# Patient Record
Sex: MALE | Race: WHITE | Employment: OTHER | URBAN - METROPOLITAN AREA
[De-identification: names, ages, dates, MRNs, and addresses within clinical notes are randomized per-mention and may not be internally consistent; named-entity substitution may affect disease eponyms.]

---

## 2018-07-28 ENCOUNTER — APPOINTMENT (OUTPATIENT)
Dept: GENERAL RADIOLOGY | Age: 23
End: 2018-07-28
Attending: EMERGENCY MEDICINE
Payer: SELF-PAY

## 2018-07-28 ENCOUNTER — HOSPITAL ENCOUNTER (EMERGENCY)
Age: 23
Discharge: HOME | End: 2018-07-28
Attending: EMERGENCY MEDICINE
Payer: SELF-PAY

## 2018-07-28 VITALS
RESPIRATION RATE: 18 BRPM | OXYGEN SATURATION: 99 % | TEMPERATURE: 97.9 F | SYSTOLIC BLOOD PRESSURE: 125 MMHG | HEIGHT: 70 IN | WEIGHT: 195 LBS | BODY MASS INDEX: 27.92 KG/M2 | DIASTOLIC BLOOD PRESSURE: 84 MMHG | HEART RATE: 64 BPM

## 2018-07-28 DIAGNOSIS — R06.02 SOB (SHORTNESS OF BREATH): Primary | ICD-10-CM

## 2018-07-28 DIAGNOSIS — H66.90 ACUTE OTITIS MEDIA, UNSPECIFIED OTITIS MEDIA TYPE: ICD-10-CM

## 2018-07-28 LAB
ANION GAP SERPL CALC-SCNC: 6 MMOL/L (ref 3–18)
ATRIAL RATE: 59 BPM
BASOPHILS # BLD: 0.1 K/UL (ref 0–0.1)
BASOPHILS NFR BLD: 1 % (ref 0–2)
BNP SERPL-MCNC: 20 PG/ML (ref 0–450)
BUN SERPL-MCNC: 22 MG/DL (ref 7–18)
BUN/CREAT SERPL: 18 (ref 12–20)
CALCIUM SERPL-MCNC: 9.2 MG/DL (ref 8.5–10.1)
CALCULATED P AXIS, ECG09: 54 DEGREES
CALCULATED R AXIS, ECG10: 50 DEGREES
CALCULATED T AXIS, ECG11: 29 DEGREES
CHLORIDE SERPL-SCNC: 105 MMOL/L (ref 100–108)
CO2 SERPL-SCNC: 31 MMOL/L (ref 21–32)
CREAT SERPL-MCNC: 1.23 MG/DL (ref 0.6–1.3)
D DIMER PPP FEU-MCNC: <0.27 UG/ML(FEU)
DIAGNOSIS, 93000: NORMAL
DIFFERENTIAL METHOD BLD: ABNORMAL
EOSINOPHIL # BLD: 0.7 K/UL (ref 0–0.4)
EOSINOPHIL NFR BLD: 7 % (ref 0–5)
ERYTHROCYTE [DISTWIDTH] IN BLOOD BY AUTOMATED COUNT: 12.4 % (ref 11.6–14.5)
GLUCOSE SERPL-MCNC: 83 MG/DL (ref 74–99)
HCT VFR BLD AUTO: 44.2 % (ref 36–48)
HGB BLD-MCNC: 15.8 G/DL (ref 13–16)
LYMPHOCYTES # BLD: 3.4 K/UL (ref 0.9–3.6)
LYMPHOCYTES NFR BLD: 35 % (ref 21–52)
MCH RBC QN AUTO: 32.4 PG (ref 24–34)
MCHC RBC AUTO-ENTMCNC: 35.7 G/DL (ref 31–37)
MCV RBC AUTO: 90.6 FL (ref 74–97)
MONOCYTES # BLD: 1 K/UL (ref 0.05–1.2)
MONOCYTES NFR BLD: 11 % (ref 3–10)
NEUTS SEG # BLD: 4.6 K/UL (ref 1.8–8)
NEUTS SEG NFR BLD: 46 % (ref 40–73)
P-R INTERVAL, ECG05: 184 MS
PLATELET # BLD AUTO: 270 K/UL (ref 135–420)
PMV BLD AUTO: 10 FL (ref 9.2–11.8)
POTASSIUM SERPL-SCNC: 3.7 MMOL/L (ref 3.5–5.5)
Q-T INTERVAL, ECG07: 386 MS
QRS DURATION, ECG06: 90 MS
QTC CALCULATION (BEZET), ECG08: 382 MS
RBC # BLD AUTO: 4.88 M/UL (ref 4.7–5.5)
SODIUM SERPL-SCNC: 142 MMOL/L (ref 136–145)
TROPONIN I SERPL-MCNC: <0.02 NG/ML (ref 0–0.04)
VENTRICULAR RATE, ECG03: 59 BPM
WBC # BLD AUTO: 9.9 K/UL (ref 4.6–13.2)

## 2018-07-28 PROCEDURE — 71046 X-RAY EXAM CHEST 2 VIEWS: CPT

## 2018-07-28 PROCEDURE — 96361 HYDRATE IV INFUSION ADD-ON: CPT

## 2018-07-28 PROCEDURE — 74011636637 HC RX REV CODE- 636/637: Performed by: EMERGENCY MEDICINE

## 2018-07-28 PROCEDURE — 93005 ELECTROCARDIOGRAM TRACING: CPT

## 2018-07-28 PROCEDURE — 85379 FIBRIN DEGRADATION QUANT: CPT | Performed by: EMERGENCY MEDICINE

## 2018-07-28 PROCEDURE — 85025 COMPLETE CBC W/AUTO DIFF WBC: CPT | Performed by: EMERGENCY MEDICINE

## 2018-07-28 PROCEDURE — 83880 ASSAY OF NATRIURETIC PEPTIDE: CPT | Performed by: EMERGENCY MEDICINE

## 2018-07-28 PROCEDURE — 84484 ASSAY OF TROPONIN QUANT: CPT | Performed by: EMERGENCY MEDICINE

## 2018-07-28 PROCEDURE — 99283 EMERGENCY DEPT VISIT LOW MDM: CPT

## 2018-07-28 PROCEDURE — 96374 THER/PROPH/DIAG INJ IV PUSH: CPT

## 2018-07-28 PROCEDURE — 80048 BASIC METABOLIC PNL TOTAL CA: CPT | Performed by: EMERGENCY MEDICINE

## 2018-07-28 PROCEDURE — 74011250636 HC RX REV CODE- 250/636: Performed by: EMERGENCY MEDICINE

## 2018-07-28 RX ORDER — AMOXICILLIN 500 MG/1
1000 TABLET, FILM COATED ORAL 3 TIMES DAILY
Qty: 30 TAB | Refills: 0 | Status: SHIPPED | OUTPATIENT
Start: 2018-07-28 | End: 2018-08-02

## 2018-07-28 RX ORDER — PREDNISONE 50 MG/1
50 TABLET ORAL DAILY
Qty: 4 TAB | Refills: 0 | Status: SHIPPED | OUTPATIENT
Start: 2018-07-28

## 2018-07-28 RX ORDER — KETOROLAC TROMETHAMINE 30 MG/ML
15 INJECTION, SOLUTION INTRAMUSCULAR; INTRAVENOUS
Status: COMPLETED | OUTPATIENT
Start: 2018-07-28 | End: 2018-07-28

## 2018-07-28 RX ORDER — SODIUM CHLORIDE 9 MG/ML
1000 INJECTION, SOLUTION INTRAVENOUS ONCE
Status: COMPLETED | OUTPATIENT
Start: 2018-07-28 | End: 2018-07-28

## 2018-07-28 RX ADMIN — SODIUM CHLORIDE 1000 ML: 900 INJECTION, SOLUTION INTRAVENOUS at 02:14

## 2018-07-28 RX ADMIN — KETOROLAC TROMETHAMINE 15 MG: 30 INJECTION, SOLUTION INTRAMUSCULAR at 02:14

## 2018-07-28 RX ADMIN — PREDNISONE 50 MG: 20 TABLET ORAL at 03:02

## 2018-07-28 NOTE — ED PROVIDER NOTES
EMERGENCY DEPARTMENT HISTORY AND PHYSICAL EXAM 
 
1:40 AM 
 
 
Date: 7/28/2018 Patient Name: Enzo Garcia History of Presenting Illness Chief Complaint Patient presents with  Cough  Shortness of Breath History Provided By: Patient Chief Complaint: SOB Duration: 2 Weeks Timing:  Constant Location: Generalized Quality: N/A Severity: Moderate Modifying Factors: Nothing makes it better or worse Associated Symptoms: Light-headedness, cough, CP, ear pain, HA, nausea Additional History (Context): Enzo Garcia is a 25 y.o. male with a PMHx of asthma who presents with constant, moderate SOB with associated light-headedness, cough, HA, nausea, CP, and left ear pain onset x 2 weeks ago. Pt was diagnosed with pneumonia. He was prescribed a Z-pack and medication for his cough. Pt reports he was seen at [facility] last week with normal workup. Pt states he used his girlfriend's inhaler about an hour and a half ago which had no relief. Pt is unaware of having a fever. Denies recent travel, edema, vomiting, and taking OTC medication. No further concerns or complaints at this time. PCP: None Past History Past Medical History: 
Past Medical History:  
Diagnosis Date  Asthma Past Surgical History: 
Past Surgical History:  
Procedure Laterality Date  HX TYMPANOSTOMY Family History: 
History reviewed. No pertinent family history. Social History: 
Social History Substance Use Topics  Smoking status: Never Smoker  Smokeless tobacco: None  Alcohol use Yes Comment: occasionally Allergies: 
No Known Allergies Review of Systems Review of Systems Constitutional: Negative for fever. HENT: Positive for ear pain (left). Respiratory: Positive for cough and shortness of breath. Cardiovascular: Positive for chest pain. Negative for leg swelling. Gastrointestinal: Positive for nausea. Negative for vomiting.   
Neurological: Positive for light-headedness and headaches. All other systems reviewed and are negative. Physical Exam  
 
Visit Vitals  /84 (BP 1 Location: Right arm, BP Patient Position: Sitting)  Pulse 64  Temp 97.9 °F (36.6 °C)  Resp 18  Ht 5' 10\" (1.778 m)  Wt 88.5 kg (195 lb)  SpO2 99%  BMI 27.98 kg/m2 Physical Exam  
Constitutional: He is oriented to person, place, and time. He appears well-developed and well-nourished. HENT:  
Head: Normocephalic and atraumatic. L TM bulging with mild erythema R TM nl  
Neck: Neck supple. No JVD present. Cardiovascular: Normal rate and regular rhythm. Pulmonary/Chest: Effort normal and breath sounds normal. No respiratory distress. He has no wheezes. Abdominal: Soft. He exhibits no distension. There is no tenderness. There is no rebound and no guarding. Musculoskeletal: He exhibits no edema. Neurological: He is alert and oriented to person, place, and time. Skin: Skin is warm and dry. No erythema. Psychiatric: Judgment normal.  
 
 
 
Diagnostic Study Results Labs - Recent Results (from the past 12 hour(s)) CBC WITH AUTOMATED DIFF Collection Time: 07/28/18  2:03 AM  
Result Value Ref Range WBC 9.9 4.6 - 13.2 K/uL  
 RBC 4.88 4.70 - 5.50 M/uL  
 HGB 15.8 13.0 - 16.0 g/dL HCT 44.2 36.0 - 48.0 % MCV 90.6 74.0 - 97.0 FL  
 MCH 32.4 24.0 - 34.0 PG  
 MCHC 35.7 31.0 - 37.0 g/dL  
 RDW 12.4 11.6 - 14.5 % PLATELET 755 384 - 572 K/uL MPV 10.0 9.2 - 11.8 FL  
 NEUTROPHILS 46 40 - 73 % LYMPHOCYTES 35 21 - 52 % MONOCYTES 11 (H) 3 - 10 % EOSINOPHILS 7 (H) 0 - 5 % BASOPHILS 1 0 - 2 %  
 ABS. NEUTROPHILS 4.6 1.8 - 8.0 K/UL  
 ABS. LYMPHOCYTES 3.4 0.9 - 3.6 K/UL  
 ABS. MONOCYTES 1.0 0.05 - 1.2 K/UL  
 ABS. EOSINOPHILS 0.7 (H) 0.0 - 0.4 K/UL  
 ABS. BASOPHILS 0.1 0.0 - 0.1 K/UL  
 DF AUTOMATED METABOLIC PANEL, BASIC Collection Time: 07/28/18  2:03 AM  
Result Value Ref Range  Sodium 142 136 - 145 mmol/L Potassium 3.7 3.5 - 5.5 mmol/L Chloride 105 100 - 108 mmol/L  
 CO2 31 21 - 32 mmol/L Anion gap 6 3.0 - 18 mmol/L Glucose 83 74 - 99 mg/dL BUN 22 (H) 7.0 - 18 MG/DL Creatinine 1.23 0.6 - 1.3 MG/DL  
 BUN/Creatinine ratio 18 12 - 20 GFR est AA >60 >60 ml/min/1.73m2 GFR est non-AA >60 >60 ml/min/1.73m2 Calcium 9.2 8.5 - 10.1 MG/DL  
D DIMER Collection Time: 07/28/18  2:03 AM  
Result Value Ref Range D DIMER <0.27 <0.46 ug/ml(FEU) NT-PRO BNP Collection Time: 07/28/18  2:03 AM  
Result Value Ref Range NT pro-BNP 20 0 - 450 PG/ML  
TROPONIN I Collection Time: 07/28/18  2:03 AM  
Result Value Ref Range Troponin-I, Qt. <0.02 0.0 - 0.045 NG/ML  
EKG, 12 LEAD, INITIAL Collection Time: 07/28/18  2:09 AM  
Result Value Ref Range Ventricular Rate 59 BPM  
 Atrial Rate 59 BPM  
 P-R Interval 184 ms QRS Duration 90 ms Q-T Interval 386 ms QTC Calculation (Bezet) 382 ms Calculated P Axis 54 degrees Calculated R Axis 50 degrees Calculated T Axis 29 degrees Diagnosis Sinus bradycardia Otherwise normal ECG No previous ECGs available Radiologic Studies -  
XR CHEST PA LAT    (Results Pending) No acute process Medical Decision Making I am the first provider for this patient. I reviewed the vital signs, available nursing notes, past medical history, past surgical history, family history and social history. Vital Signs-Reviewed the patient's vital signs. Pulse Oximetry Analysis -  99% on room air, normal. 
 
EKG: Interpreted by the EP. Time Interpreted: 1738 Rate: 59 Rhythm: sinus chapin, Interpretation: normal axis, t wave inversion III, no ST changes Comparison: none for comparison Records Reviewed: Nursing Notes and Old Medical Records (Time of Review: 1:40 AM) ED Course: Progress Notes, Reevaluation, and Consults: 
 
 
Provider Notes (Medical Decision Making): 24 y/o male presents with sob, did have palpitations after using albuterol, but did not improve sob, no wheezing at this time. Due to prior ED visit 4 days ago, will check labs, add d-dimer, although no PE risk factors. Screen for pna with cxr. Check ekg.  
 
3:02 AM 
Discussed results with pt, will give trial of prednisone due to hx of asthma as child. Advised pcp follow up. Treat AOM. Diagnosis Clinical Impression:  
1. SOB (shortness of breath) 2. Acute otitis media, unspecified otitis media type Disposition: discharged Follow-up Information Follow up With Details Comments Contact Info Jennifer Ulrich MD Schedule an appointment as soon as possible for a visit  [id number] [facility] [address] [place] [id number] 
[phone] [facility] EMERGENCY DEPT  As needed, If symptoms worsen [phone] [facility] 
[phone] Patient's Medications Start Taking AMOXICILLIN 500 MG TAB    Take 1,000 mg by mouth three (3) times daily for 5 days. PREDNISONE (DELTASONE) 50 MG TABLET    Take 1 Tab by mouth daily. Continue Taking No medications on file These Medications have changed No medications on file Stop Taking No medications on file  
 
_______________________________ Attestations: 
Scribe Attestation Herrera Davila acting as a scribe for and in the presence of Nelia Carlos DO     
July 28, 2018 at 1:40 AM 
    
Provider Attestation:     
I personally performed the services described in the documentation, reviewed the documentation, as recorded by the scribe in my presence, and it accurately and completely records my words and actions. July 28, 2018 at 1:40 AM - Nelia Carlos,    
_______________________________

## 2018-07-28 NOTE — DISCHARGE INSTRUCTIONS
Ear Infection (Otitis Media): Care Instructions  Your Care Instructions    An ear infection may start with a cold and affect the middle ear (otitis media). It can hurt a lot. Most ear infections clear up on their own in a couple of days. Most often you will not need antibiotics. This is because many ear infections are caused by a virus. Antibiotics don't work against a virus. Regular doses of pain medicines are the best way to reduce your fever and help you feel better. Follow-up care is a key part of your treatment and safety. Be sure to make and go to all appointments, and call your doctor if you are having problems. It's also a good idea to know your test results and keep a list of the medicines you take. How can you care for yourself at home? · Take pain medicines exactly as directed. ¨ If the doctor gave you a prescription medicine for pain, take it as prescribed. ¨ If you are not taking a prescription pain medicine, take an over-the-counter medicine, such as acetaminophen (Tylenol), ibuprofen (Advil, Motrin), or naproxen (Aleve). Read and follow all instructions on the label. ¨ Do not take two or more pain medicines at the same time unless the doctor told you to. Many pain medicines have acetaminophen, which is Tylenol. Too much acetaminophen (Tylenol) can be harmful. · Plan to take a full dose of pain reliever before bedtime. Getting enough sleep will help you get better. · Try a warm, moist washcloth on the ear. It may help relieve pain. · If your doctor prescribed antibiotics, take them as directed. Do not stop taking them just because you feel better. You need to take the full course of antibiotics. When should you call for help?   Call your doctor now or seek immediate medical care if:    · You have new or increasing ear pain.     · You have new or increasing pus or blood draining from your ear.     · You have a fever with a stiff neck or a severe headache.    Watch closely for changes in your health, and be sure to contact your doctor if:    · You have new or worse symptoms.     · You are not getting better after taking an antibiotic for 2 days. Where can you learn more? Go to http://thalia-rachel.info/. Enter H613 in the search box to learn more about \"Ear Infection (Otitis Media): Care Instructions. \"  Current as of: May 12, 2017  Content Version: 11.7  © 2418-3818 [employer]. Care instructions adapted under license by [employer] (which disclaims liability or warranty for this information). If you have questions about a medical condition or this instruction, always ask your healthcare professional. [address] any warranty or liability for your use of this information.

## 2018-10-15 ENCOUNTER — APPOINTMENT (OUTPATIENT)
Dept: GENERAL RADIOLOGY | Age: 23
End: 2018-10-15
Attending: EMERGENCY MEDICINE
Payer: SELF-PAY

## 2018-10-15 ENCOUNTER — HOSPITAL ENCOUNTER (EMERGENCY)
Age: 23
Discharge: HOME | End: 2018-10-15
Attending: EMERGENCY MEDICINE
Payer: SELF-PAY

## 2018-10-15 VITALS
BODY MASS INDEX: 27.2 KG/M2 | HEIGHT: 70 IN | OXYGEN SATURATION: 100 % | RESPIRATION RATE: 11 BRPM | SYSTOLIC BLOOD PRESSURE: 132 MMHG | TEMPERATURE: 98.7 F | WEIGHT: 190 LBS | DIASTOLIC BLOOD PRESSURE: 68 MMHG | HEART RATE: 53 BPM

## 2018-10-15 DIAGNOSIS — R06.02 SOB (SHORTNESS OF BREATH): ICD-10-CM

## 2018-10-15 DIAGNOSIS — M54.6 ACUTE BILATERAL THORACIC BACK PAIN: ICD-10-CM

## 2018-10-15 DIAGNOSIS — R07.9 CHEST PAIN, UNSPECIFIED TYPE: Primary | ICD-10-CM

## 2018-10-15 LAB
ALBUMIN SERPL-MCNC: 4.4 G/DL (ref 3.4–5)
ALBUMIN/GLOB SERPL: 1.5 {RATIO} (ref 0.8–1.7)
ALP SERPL-CCNC: 79 U/L (ref 45–117)
ALT SERPL-CCNC: 17 U/L (ref 16–61)
ANION GAP SERPL CALC-SCNC: 5 MMOL/L (ref 3–18)
AST SERPL-CCNC: 4 U/L (ref 15–37)
ATRIAL RATE: 51 BPM
BASOPHILS # BLD: 0.1 K/UL (ref 0–0.1)
BASOPHILS NFR BLD: 1 % (ref 0–2)
BILIRUB SERPL-MCNC: 0.9 MG/DL (ref 0.2–1)
BUN SERPL-MCNC: 18 MG/DL (ref 7–18)
BUN/CREAT SERPL: 15 (ref 12–20)
CALCIUM SERPL-MCNC: 9.2 MG/DL (ref 8.5–10.1)
CALCULATED P AXIS, ECG09: 46 DEGREES
CALCULATED R AXIS, ECG10: 69 DEGREES
CALCULATED T AXIS, ECG11: 48 DEGREES
CHLORIDE SERPL-SCNC: 106 MMOL/L (ref 100–108)
CK MB CFR SERPL CALC: NORMAL % (ref 0–4)
CK MB SERPL-MCNC: <1 NG/ML (ref 5–25)
CK SERPL-CCNC: 59 U/L (ref 39–308)
CO2 SERPL-SCNC: 30 MMOL/L (ref 21–32)
CREAT SERPL-MCNC: 1.18 MG/DL (ref 0.6–1.3)
D DIMER PPP FEU-MCNC: <0.27 UG/ML(FEU)
DIAGNOSIS, 93000: NORMAL
DIFFERENTIAL METHOD BLD: NORMAL
EOSINOPHIL # BLD: 0.3 K/UL (ref 0–0.4)
EOSINOPHIL NFR BLD: 4 % (ref 0–5)
ERYTHROCYTE [DISTWIDTH] IN BLOOD BY AUTOMATED COUNT: 12.2 % (ref 11.6–14.5)
GLOBULIN SER CALC-MCNC: 2.9 G/DL (ref 2–4)
GLUCOSE SERPL-MCNC: 94 MG/DL (ref 74–99)
HCT VFR BLD AUTO: 45.3 % (ref 36–48)
HGB BLD-MCNC: 15.9 G/DL (ref 13–16)
LYMPHOCYTES # BLD: 2.7 K/UL (ref 0.9–3.6)
LYMPHOCYTES NFR BLD: 37 % (ref 21–52)
MCH RBC QN AUTO: 31.9 PG (ref 24–34)
MCHC RBC AUTO-ENTMCNC: 35.1 G/DL (ref 31–37)
MCV RBC AUTO: 91 FL (ref 74–97)
MONOCYTES # BLD: 0.8 K/UL (ref 0.05–1.2)
MONOCYTES NFR BLD: 10 % (ref 3–10)
NEUTS SEG # BLD: 3.6 K/UL (ref 1.8–8)
NEUTS SEG NFR BLD: 48 % (ref 40–73)
P-R INTERVAL, ECG05: 174 MS
PLATELET # BLD AUTO: 260 K/UL (ref 135–420)
PMV BLD AUTO: 9.5 FL (ref 9.2–11.8)
POTASSIUM SERPL-SCNC: 4.1 MMOL/L (ref 3.5–5.5)
PROT SERPL-MCNC: 7.3 G/DL (ref 6.4–8.2)
Q-T INTERVAL, ECG07: 418 MS
QRS DURATION, ECG06: 90 MS
QTC CALCULATION (BEZET), ECG08: 385 MS
RBC # BLD AUTO: 4.98 M/UL (ref 4.7–5.5)
SODIUM SERPL-SCNC: 141 MMOL/L (ref 136–145)
TROPONIN I SERPL-MCNC: <0.02 NG/ML (ref 0–0.04)
VENTRICULAR RATE, ECG03: 51 BPM
WBC # BLD AUTO: 7.5 K/UL (ref 4.6–13.2)

## 2018-10-15 PROCEDURE — 85025 COMPLETE CBC W/AUTO DIFF WBC: CPT | Performed by: EMERGENCY MEDICINE

## 2018-10-15 PROCEDURE — 82550 ASSAY OF CK (CPK): CPT | Performed by: EMERGENCY MEDICINE

## 2018-10-15 PROCEDURE — 80053 COMPREHEN METABOLIC PANEL: CPT | Performed by: EMERGENCY MEDICINE

## 2018-10-15 PROCEDURE — 93005 ELECTROCARDIOGRAM TRACING: CPT

## 2018-10-15 PROCEDURE — 99285 EMERGENCY DEPT VISIT HI MDM: CPT

## 2018-10-15 PROCEDURE — 71046 X-RAY EXAM CHEST 2 VIEWS: CPT

## 2018-10-15 PROCEDURE — 85379 FIBRIN DEGRADATION QUANT: CPT | Performed by: EMERGENCY MEDICINE

## 2018-10-15 RX ORDER — IBUPROFEN 800 MG/1
800 TABLET ORAL EVERY 8 HOURS
Qty: 15 TAB | Refills: 0 | Status: SHIPPED | OUTPATIENT
Start: 2018-10-15 | End: 2018-10-20

## 2018-10-15 RX ORDER — CYCLOBENZAPRINE HCL 5 MG
10 TABLET ORAL 3 TIMES DAILY
Qty: 18 TAB | Refills: 0 | Status: SHIPPED | OUTPATIENT
Start: 2018-10-15 | End: 2018-10-18

## 2018-10-15 NOTE — ED TRIAGE NOTES
Trouble breathing for a few days. Chest pain for 3 days, back pain since this morning, woke self up 3-4 times feeling like wasn't breathing tonight. Seen for the same thing recently. Seen at [facility] on thurday for the same and was prescribed Lexapro

## 2018-10-15 NOTE — ED NOTES
Recieved report, assumed care of patient. Pt sitting up in stretcher in NAD with mother at bedside. VSS on the monitor.

## 2018-10-15 NOTE — ED PROVIDER NOTES
[facility] EMERGENCY DEPT 
 
 
6:25 AM 
 
Date: 10/15/2018 Patient Name: Chao Marlow History of Presenting Illness Chief Complaint Patient presents with  Chest Pain  Back Pain History Provided By: Patient Chief Complaint: CP 
Duration:  Days Timing:  Constant Location: Midsternal and L sided Quality: N/A Severity: 8 out of 10 Modifying Factors: none reported Associated Symptoms: SOB, HAs, back pain 25 y.o. male with noted past medical history who presents to the emergency department with severe, midsternal and L sided constant CP x3 days which radiates to the middle of his back causing back pain. Pt also notes SOB for a few months, intermittently and randomly for 1hr at a time. Denies recent long trips. Also c/o recent HAs for the past months described as a \"numbness\" in his head. No HA in ED. Pt works in [employer]; no etOH or tobacco use. No other complaints. Nursing notes regarding the HPI and triage nursing notes were reviewed. Prior medical records were reviewed. Current Outpatient Prescriptions Medication Sig Dispense Refill  escitalopram oxalate (LEXAPRO PO) Take  by mouth.  predniSONE (DELTASONE) 50 mg tablet Take 1 Tab by mouth daily. 4 Tab 0 Past History Past Medical History: 
Past Medical History:  
Diagnosis Date  Asthma Past Surgical History: 
Past Surgical History:  
Procedure Laterality Date  HX TYMPANOSTOMY Family History: 
History reviewed. No pertinent family history. Social History: 
Social History Substance Use Topics  Smoking status: Never Smoker  Smokeless tobacco: None  Alcohol use Yes Comment: occasionally Allergies: 
No Known Allergies Patient's primary care provider (as noted in EPIC):  None Review of Systems Constitutional: Negative for diaphoresis. HENT: Negative for congestion. Eyes: Negative for discharge. Respiratory: Positive for shortness of breath. Negative for stridor. Cardiovascular: Positive for chest pain. Negative for palpitations. Gastrointestinal: Negative for diarrhea. Genitourinary: Negative for flank pain. Musculoskeletal: Positive for back pain. Neurological: Positive for headaches. Negative for weakness. Psychiatric/Behavioral: Negative for hallucinations. All other systems reviewed and are negative. Visit Vitals  /67  Pulse (!) 55  Temp 98.7 °F (37.1 °C)  Resp 15  Ht 5' 10\" (1.778 m)  Wt 86.2 kg (190 lb)  SpO2 100%  BMI 27.26 kg/m2 PHYSICAL EXAM: 
 
CONSTITUTIONAL:  Alert, in no apparent distress;  well developed;  well nourished. HEAD:  Normocephalic, atraumatic. EYES:  EOMI. Non-icteric sclera. Normal conjunctiva. ENTM:  Nose:  no rhinorrhea. Throat:  no erythema or exudate, mucous membranes moist. 
NECK:  No JVD. Supple RESPIRATORY:  Chest clear, equal breath sounds, good air movement. CARDIOVASCULAR:  Regular rate and rhythm. No murmurs, rubs, or gallops. Chest:  No rash, lesions, bruising. No reproducible tenderness to palpation. GI:  Normal bowel sounds, abdomen soft and non-tender. No rebound or guarding. BACK:  Non-tender. UPPER EXT:  Normal inspection. LOWER EXT:  No edema, no calf tenderness. Distal pulses intact. NEURO:  Moves all four extremities, and grossly normal motor exam. 
SKIN:  No rashes;  Normal for age. PSYCH:  Alert and normal affect.  
 
DIFFERENTIAL DIAGNOSES/ MEDICAL DECISION MAKING: 
Chest pain etiologies include acute cardiac events to include possible acute myocardial infarction, acute coronary syndrome, pneumonia, chest wall pain (myofascial/ musculoskeletal etiology), chronic obstructive pulmonary disease (copd), acute asthma exacerbation, congestive heart failure, acute bronchitis, pulmonary embolism, upper respiratory infection, referred abdominal pain, other etiologies, versus combination of the above. Diagnostic Study Results Abnormal lab results from this emergency department encounter: 
Labs Reviewed METABOLIC PANEL, COMPREHENSIVE - Abnormal; Notable for the following:   
    Result Value AST (SGOT) 4 (*) All other components within normal limits CBC WITH AUTOMATED DIFF  
CARDIAC PANEL,(CK, CKMB & TROPONIN) D DIMER Lab values for this patient within approximately the last 12 hours: 
Recent Results (from the past 12 hour(s)) EKG, 12 LEAD, INITIAL Collection Time: 10/15/18  5:52 AM  
Result Value Ref Range Ventricular Rate 51 BPM  
 Atrial Rate 51 BPM  
 P-R Interval 174 ms QRS Duration 90 ms Q-T Interval 418 ms QTC Calculation (Bezet) 385 ms Calculated P Axis 46 degrees Calculated R Axis 69 degrees Calculated T Axis 48 degrees Diagnosis Sinus bradycardia Otherwise normal ECG When compared with ECG of 28-JUL-2018 02:09, No significant change was found CBC WITH AUTOMATED DIFF Collection Time: 10/15/18  6:24 AM  
Result Value Ref Range WBC 7.5 4.6 - 13.2 K/uL  
 RBC 4.98 4.70 - 5.50 M/uL  
 HGB 15.9 13.0 - 16.0 g/dL HCT 45.3 36.0 - 48.0 % MCV 91.0 74.0 - 97.0 FL  
 MCH 31.9 24.0 - 34.0 PG  
 MCHC 35.1 31.0 - 37.0 g/dL  
 RDW 12.2 11.6 - 14.5 % PLATELET 924 359 - 340 K/uL MPV 9.5 9.2 - 11.8 FL  
 NEUTROPHILS 48 40 - 73 % LYMPHOCYTES 37 21 - 52 % MONOCYTES 10 3 - 10 % EOSINOPHILS 4 0 - 5 % BASOPHILS 1 0 - 2 %  
 ABS. NEUTROPHILS 3.6 1.8 - 8.0 K/UL  
 ABS. LYMPHOCYTES 2.7 0.9 - 3.6 K/UL  
 ABS. MONOCYTES 0.8 0.05 - 1.2 K/UL  
 ABS. EOSINOPHILS 0.3 0.0 - 0.4 K/UL  
 ABS. BASOPHILS 0.1 0.0 - 0.1 K/UL  
 DF AUTOMATED METABOLIC PANEL, COMPREHENSIVE Collection Time: 10/15/18  6:24 AM  
Result Value Ref Range Sodium 141 136 - 145 mmol/L Potassium 4.1 3.5 - 5.5 mmol/L  Chloride 106 100 - 108 mmol/L  
 CO2 30 21 - 32 mmol/L  
 Anion gap 5 3.0 - 18 mmol/L Glucose 94 74 - 99 mg/dL BUN 18 7.0 - 18 MG/DL Creatinine 1.18 0.6 - 1.3 MG/DL  
 BUN/Creatinine ratio 15 12 - 20 GFR est AA >60 >60 ml/min/1.73m2 GFR est non-AA >60 >60 ml/min/1.73m2 Calcium 9.2 8.5 - 10.1 MG/DL Bilirubin, total 0.9 0.2 - 1.0 MG/DL  
 ALT (SGPT) 17 16 - 61 U/L  
 AST (SGOT) 4 (L) 15 - 37 U/L Alk. phosphatase 79 45 - 117 U/L Protein, total 7.3 6.4 - 8.2 g/dL Albumin 4.4 3.4 - 5.0 g/dL Globulin 2.9 2.0 - 4.0 g/dL A-G Ratio 1.5 0.8 - 1.7 CARDIAC PANEL,(CK, CKMB & TROPONIN) Collection Time: 10/15/18  6:24 AM  
Result Value Ref Range CK 59 39 - 308 U/L  
 CK - MB <1.0 <3.6 ng/ml CK-MB Index  0.0 - 4.0 % CALCULATION NOT PERFORMED WHEN RESULT IS BELOW LINEAR LIMIT Troponin-I, Qt. <0.02 0.0 - 0.045 NG/ML  
D DIMER Collection Time: 10/15/18  6:24 AM  
Result Value Ref Range D DIMER <0.27 <0.46 ug/ml(FEU) Radiologist and cardiologist interpretations if available at time of this note: No results found. CXR:  Preliminary review of x-rays by ED Physician. Interpretation of chest X-ray shows, no infiltrates, no pneumothorax, no CHF, no effusion. Medication(s) ordered for patient during this emergency visit encounter: 
Medications - No data to display Medical Decision Making I am the first provider for this patient. I reviewed the vital signs, available nursing notes, past medical history, past surgical history, family history and social history. Vital Signs:  Reviewed the patient's vital signs. Initial EKG interpretation by attending emergency physician:  Sinus bradycardia about 50 bpm. 
 
ED COURSE: 
 
 
 
Patient's PERC screening was negative. Patient's HAART SCORE:   
History:  0 
EC Age:  0 Risk factors:  0 Troponin:  0 Patient's Total HAART score:  0 ED COURSE:  One set of cardiac enzymes was normal.   
 
IMPRESSION AND MEDICAL DECISION MAKING: 
 Based upon the patients presentation with noted HPI and PE, along with the work up done in the emergency department, I believe that the patient is having non-cardiac chest pain as noted. Given the time frame of the patients chest pain, an acute cardiac event could be ruled out with one set of normal cardiac enzymes. DIAGNOSIS: 
1. Chest pain SPECIFIC PATIENT INSTRUCTIONS FROM THE EMERGENCY PHYSICIAN WHO TREATED YOU TODAY: 
1. Return if worse. 2. Follow up with your primary doctor or your cardiologist (if you have one) in the next 2-4 days for reevaluation. 3. If prescribed, take the ibuprofen and flexeril as prescribed until finished. Patient is improved, resting quietly and comfortably. The patient will be discharged home. The patient was reassured that these symptoms do not appear to represent a serious or life threatening condition at this time. Warning signs of worsening condition were discussed and understood by the patient. Based on patient's age, coexisting illness, exam, and the results of this ED evaluation, the decision to treat as an outpatient was made. Based on the information available at time of discharge, acute pathology requiring immediate intervention was deemed relative unlikely. While it is impossible to completely exclude the possibility of underlying serious disease or worsening of condition, I feel the relative likelihood is extremely low. I discussed this uncertainty with the patient, who understood ED evaluation and treatment and felt comfortable with the outpatient treatment plan. All questions regarding care, test results, and follow up were answered. The patient is stable and appropriate to discharge. They understand that they should return to the emergency department for any new or worsening symptoms. I stressed the importance of follow up for repeat assessment and possibly further evaluation/treatment. Coding Diagnoses Clinical Impression: 1. Chest pain, unspecified type 2. Acute bilateral thoracic back pain 3. SOB (shortness of breath) Disposition Disposition:  Home. SPENSER Hoyt Board Certified Emergency Physician Provider Attestation: If a scribe was utilized in generation of this patient record, I personally performed the services described in the documentation, reviewed the documentation, as recorded by the scribe in my presence, and it accurately records the patient's history of presenting illness, review of systems, patient physical examination, and procedures performed by me as the attending physician. SPENSER Hoyt Board Certified Emergency Physician 
10/15/2018. 
6:27 AM 
 
Scribe Attestation Rhonda Catalino acting as a scribe for and in the presence of Char Rankin MD     
October 15, 2018 at 7:42 AM 
    
Provider Attestation:     
I personally performed the services described in the documentation, reviewed the documentation, as recorded by the scribe in my presence, and it accurately and completely records my words and actions.  October 15, 2018 at 7:42 AM - Char Rankin MD

## 2018-10-15 NOTE — ED NOTES
Pt and mother concerned regarding patients continued dizziness. DR Reji Tapia updated. Will be over to speak to patient.

## 2018-10-15 NOTE — ED NOTES
I have reviewed discharge instructions with the patient. The patient verbalized understanding. Patient armband removed and given to patient to take home. Patient was informed of the privacy risks if armband lost or stolen. Pt alert, oriented x4 and ambulatory out of ER in [facility] at this time. VSS.